# Patient Record
Sex: FEMALE | Race: WHITE | Employment: UNEMPLOYED | URBAN - METROPOLITAN AREA
[De-identification: names, ages, dates, MRNs, and addresses within clinical notes are randomized per-mention and may not be internally consistent; named-entity substitution may affect disease eponyms.]

---

## 2018-01-15 ENCOUNTER — OFFICE VISIT (OUTPATIENT)
Dept: FAMILY MEDICINE CLINIC | Facility: CLINIC | Age: 43
End: 2018-01-15

## 2018-01-15 VITALS
HEART RATE: 80 BPM | SYSTOLIC BLOOD PRESSURE: 106 MMHG | HEIGHT: 70 IN | OXYGEN SATURATION: 99 % | WEIGHT: 197.81 LBS | BODY MASS INDEX: 28.32 KG/M2 | DIASTOLIC BLOOD PRESSURE: 70 MMHG | RESPIRATION RATE: 16 BRPM | TEMPERATURE: 98 F

## 2018-01-15 DIAGNOSIS — J20.9 ACUTE BRONCHITIS, UNSPECIFIED ORGANISM: Primary | ICD-10-CM

## 2018-01-15 PROCEDURE — 99213 OFFICE O/P EST LOW 20 MIN: CPT | Performed by: NURSE PRACTITIONER

## 2018-01-15 RX ORDER — AZITHROMYCIN 250 MG/1
TABLET, FILM COATED ORAL
Qty: 6 TABLET | Refills: 0 | Status: SHIPPED | OUTPATIENT
Start: 2018-01-15 | End: 2018-02-05 | Stop reason: ALTCHOICE

## 2018-01-15 NOTE — PROGRESS NOTES
Chief complaint:  Patient presents with:  Cough: cough, chest congestion, fever, diarrhea, head congestion. 10 days      HPI:   Jenn Sargent is a 38year old female who presents for upper respiratory symptoms for  10  days.  C/o: cough, sinus and chest co EYES:PERRLA, EOMI, conjunctiva are clear, no injection or discharge, no orbital edema or erythema  HEAD: atraumatic, normocephalic, no sinus tenderness on palpation  ENT: TMs pearly, no bulging, no retraction, no fluid, landmarks present.  nares patent, muc Air travels in and out of the lungs through the airways. The linings of these airways produce sticky mucus. This mucus traps particles that enter the lungs. Tiny structures called cilia then sweep the particles out of the airways.      Healthy airway: Airwa The main treatment for bronchitis is easing symptoms. Avoiding smoke, allergens, and other things that trigger coughing can often help. If the infection is bacterial, you may be given antibiotics. During the illness, it's important to get plenty of sleep. You should see your provider again in 2 to 3 weeks. By this time, symptoms should have improved. An infection that lasts longer may mean you have a more serious problem. Prevention  · Avoid tobacco smoke. If you smoke, quit. Stay away from smoky places.  A

## 2018-01-15 NOTE — PATIENT INSTRUCTIONS
Push fluids, rest.  Start antibiotic in the next 2 days if not feeling better, take with food. Tylenol or ibuprofen over the counter for discomfort. Return if symptoms worsen or do not improve in 2-3 days. What Is Acute Bronchitis?   Acute bronchitis i · A sputum test for bacteria. This requires a sample of mucus from your lungs. · A nasal or throat swab. This tests to see if you have a bacterial infection. · A chest X-ray. This is done if your healthcare provider thinks you have pneumonia.   · Tests to · Take all of the medicine. Take the medicine until it is used up, even if symptoms have improved. If you don’t, the bronchitis may come back. · Take the medicines as directed. For instance, some medicines should be taken with food.   · Ask about side effe

## 2018-02-05 ENCOUNTER — OFFICE VISIT (OUTPATIENT)
Dept: FAMILY MEDICINE CLINIC | Facility: CLINIC | Age: 43
End: 2018-02-05

## 2018-02-05 VITALS
HEIGHT: 70 IN | WEIGHT: 195 LBS | HEART RATE: 103 BPM | BODY MASS INDEX: 27.92 KG/M2 | DIASTOLIC BLOOD PRESSURE: 70 MMHG | TEMPERATURE: 99 F | OXYGEN SATURATION: 98 % | RESPIRATION RATE: 18 BRPM | SYSTOLIC BLOOD PRESSURE: 116 MMHG

## 2018-02-05 DIAGNOSIS — T36.95XA ANTIBIOTIC-INDUCED YEAST INFECTION: ICD-10-CM

## 2018-02-05 DIAGNOSIS — J20.9 ACUTE BRONCHITIS, UNSPECIFIED ORGANISM: Primary | ICD-10-CM

## 2018-02-05 DIAGNOSIS — B37.9 ANTIBIOTIC-INDUCED YEAST INFECTION: ICD-10-CM

## 2018-02-05 PROCEDURE — 99214 OFFICE O/P EST MOD 30 MIN: CPT | Performed by: NURSE PRACTITIONER

## 2018-02-05 RX ORDER — FLUCONAZOLE 150 MG/1
TABLET ORAL
Qty: 2 TABLET | Refills: 0 | Status: SHIPPED | OUTPATIENT
Start: 2018-02-05 | End: 2018-05-16 | Stop reason: ALTCHOICE

## 2018-02-05 NOTE — PATIENT INSTRUCTIONS
Push fluids, rest.  Start antibiotic zpack as prescribed from last office visit, take with food. Tylenol or ibuprofen over the counter for discomfort. Mucinex over the counter as needed for congestion.   Flonase (fluticasone is generic) nasal spray over t Your healthcare provider will ask you about your symptoms. The exam  Your provider listens [place] chest for signs of congestion. He or she may also check your ears, nose, and throat. Possible tests  · A sputum test for bacteria.  This requires a sample o Most cases of bronchitis are caused by cold or flu viruses. They don’t need antibiotics to treat them, even if your mucus is thick and green or yellow.  Antibiotics don’t treat viral illness and antibiotics have not been shown to have any benefit in cases o © 3784-8655 [employer] [address]. [address] [facility], [facility] [place]. All rights reserved. This information is not intended as a substitute for professional medical care. Always follow your healthcare professional's instructions.

## 2018-02-05 NOTE — PROGRESS NOTES
Chief complaint:  Patient presents with:  Cough: x 3 days has gotten worse, productive   Chest Congestion  Nasal Congestion    HPI:   Nicky Guerrero is a 38year old female who presents for upper respiratory symptoms for  3  weeks.   Patient was seen about /70 (BP Location: Right arm, Patient Position: Sitting, Cuff Size: adult)   Pulse 103   Temp 99.4 °F (37.4 °C) (Tympanic)   Resp 18   Ht 70\"   Wt 195 lb   SpO2 98%   BMI 27.98 kg/m²   GENERAL: well developed, well nourished,in no apparent distress, Flonase (fluticasone is generic) nasal spray over the counter--1 spray each nostril twice a day. Look down at toes when using this nasal spray. Return if symptoms worsen or do not improve in 2-3 days. What Is Acute Bronchitis?   Acute bronchitis is when · A sputum test for bacteria. This requires a sample of mucus from your lungs. · A nasal or throat swab. This tests to see if you have a bacterial infection. · A chest X-ray. This is done if your healthcare provider thinks you have pneumonia.   · Tests to · Take all of the medicine. Take the medicine until it is used up, even if symptoms have improved. If you don’t, the bronchitis may come back. · Take the medicines as directed. For instance, some medicines should be taken with food.   · Ask about side effe

## 2018-04-30 ENCOUNTER — TELEPHONE (OUTPATIENT)
Dept: FAMILY MEDICINE CLINIC | Facility: CLINIC | Age: 43
End: 2018-04-30

## 2018-04-30 NOTE — TELEPHONE ENCOUNTER
[facility]. Is not sure if she received a tetnus. Is ok to get tetnus? [place]     No documentation, should she wait or ok to get one now?     Per NEELIMA advised a physical with dr. Sebastián Oden

## 2018-05-16 ENCOUNTER — OFFICE VISIT (OUTPATIENT)
Dept: FAMILY MEDICINE CLINIC | Facility: CLINIC | Age: 43
End: 2018-05-16

## 2018-05-16 VITALS
WEIGHT: 190.63 LBS | TEMPERATURE: 100 F | HEIGHT: 70 IN | BODY MASS INDEX: 27.29 KG/M2 | DIASTOLIC BLOOD PRESSURE: 66 MMHG | RESPIRATION RATE: 16 BRPM | SYSTOLIC BLOOD PRESSURE: 120 MMHG | OXYGEN SATURATION: 96 % | HEART RATE: 98 BPM

## 2018-05-16 DIAGNOSIS — J02.9 SORE THROAT: Primary | ICD-10-CM

## 2018-05-16 PROCEDURE — 99213 OFFICE O/P EST LOW 20 MIN: CPT | Performed by: FAMILY MEDICINE

## 2018-05-16 PROCEDURE — 87081 CULTURE SCREEN ONLY: CPT | Performed by: FAMILY MEDICINE

## 2018-05-16 PROCEDURE — 87880 STREP A ASSAY W/OPTIC: CPT | Performed by: FAMILY MEDICINE

## 2018-05-16 RX ORDER — OMEGA-3 FATTY ACIDS/FISH OIL 300-1000MG
1 CAPSULE ORAL AS NEEDED
COMMUNITY

## 2018-05-16 NOTE — PROGRESS NOTES
CHIEF COMPLAINT:   Patient presents with:  Sore Throat  Fever      HPI:   Niki Lott is a 37year old female who presents to clinic today with complaints of sore throat.  Within an hour last pm- developed fever and aching neck, glands swelling    advi murmur  EXTREMITIES: no cyanosis, clubbing or edema  LYMPH: tonsilar  Lymphadenopathy bilaterally    ASSESSMENT AND PLAN:   Sarah Izaguirre is a 37year old female who presents with ear problems symptoms are consistent with    ASSESSMENT:  Sore throat  (zabrina

## 2018-05-18 ENCOUNTER — TELEPHONE (OUTPATIENT)
Dept: FAMILY MEDICINE CLINIC | Facility: CLINIC | Age: 43
End: 2018-05-18

## 2020-11-30 ENCOUNTER — TELEPHONE (OUTPATIENT)
Dept: FAMILY MEDICINE CLINIC | Facility: CLINIC | Age: 45
End: 2020-11-30

## 2020-11-30 NOTE — TELEPHONE ENCOUNTER
Pt called with very vague questions re: needing to find a dietician for her daughter due to some concerns re: her diet. Ketty states she has very limited amount of time due to her schedule. Ketty was reserved with information relayed.   States rather than

## 2023-02-16 ENCOUNTER — PATIENT OUTREACH (OUTPATIENT)
Dept: CASE MANAGEMENT | Age: 48
End: 2023-02-16

## 2023-02-16 NOTE — PROCEDURES
The office order for PCP request is Approved and finalized on February 16, 2023.     Thanks,  [facility] [employer]